# Patient Record
Sex: FEMALE | Race: WHITE | NOT HISPANIC OR LATINO | Employment: UNEMPLOYED | URBAN - METROPOLITAN AREA
[De-identification: names, ages, dates, MRNs, and addresses within clinical notes are randomized per-mention and may not be internally consistent; named-entity substitution may affect disease eponyms.]

---

## 2019-10-02 ENCOUNTER — OFFICE VISIT (OUTPATIENT)
Dept: FAMILY MEDICINE CLINIC | Facility: OTHER | Age: 54
End: 2019-10-02
Payer: COMMERCIAL

## 2019-10-02 VITALS
OXYGEN SATURATION: 98 % | DIASTOLIC BLOOD PRESSURE: 84 MMHG | WEIGHT: 126.13 LBS | HEIGHT: 62 IN | TEMPERATURE: 98.8 F | SYSTOLIC BLOOD PRESSURE: 116 MMHG | HEART RATE: 76 BPM | BODY MASS INDEX: 23.21 KG/M2

## 2019-10-02 DIAGNOSIS — Z23 ENCOUNTER FOR IMMUNIZATION: Primary | ICD-10-CM

## 2019-10-02 DIAGNOSIS — R22.1 LUMP IN NECK: ICD-10-CM

## 2019-10-02 DIAGNOSIS — Z12.39 BREAST CANCER SCREENING: ICD-10-CM

## 2019-10-02 DIAGNOSIS — Z13.6 SCREENING FOR CARDIOVASCULAR CONDITION: ICD-10-CM

## 2019-10-02 DIAGNOSIS — Z13.1 SCREENING FOR DIABETES MELLITUS: ICD-10-CM

## 2019-10-02 PROCEDURE — 99214 OFFICE O/P EST MOD 30 MIN: CPT | Performed by: FAMILY MEDICINE

## 2019-10-02 PROCEDURE — 90715 TDAP VACCINE 7 YRS/> IM: CPT | Performed by: FAMILY MEDICINE

## 2019-10-02 PROCEDURE — 90682 RIV4 VACC RECOMBINANT DNA IM: CPT | Performed by: FAMILY MEDICINE

## 2019-10-02 PROCEDURE — 90472 IMMUNIZATION ADMIN EACH ADD: CPT | Performed by: FAMILY MEDICINE

## 2019-10-02 PROCEDURE — 90471 IMMUNIZATION ADMIN: CPT | Performed by: FAMILY MEDICINE

## 2019-10-02 NOTE — PROGRESS NOTES
Subjective:      Patient ID: Yesenia Nieto is a 48 y o  female  Patient is a 48year old female with no significant PMHx who presents after noticing 3 lumps on her neck this past Sunday  Patient says that the lumps are not painful or red  Patient denies changes in appetite or weight  Denies fatigue or fever  Bowel movements and urination are normal   Patient also complaining of a rash  Rash   This is a chronic problem  Episode onset: 3 months  The problem is unchanged  The affected locations include the abdomen (epigastric region )  The rash is characterized by redness and itchiness  She was exposed to nothing  Pertinent negatives include no anorexia, congestion, cough, diarrhea, eye pain, facial edema, fatigue, fever, joint pain, nail changes, rhinorrhea, shortness of breath, sore throat or vomiting  Past treatments include moisturizer  Improvement on treatment: rash did not improve, but pruritis did improve  There is no history of allergies, asthma or eczema  The following portions of the patient's history were reviewed and updated as appropriate: allergies, current medications, past family history, past medical history, past social history, past surgical history and problem list      Review of Systems   Constitutional: Negative for activity change, appetite change, chills, fatigue, fever and unexpected weight change  HENT: Negative for congestion, rhinorrhea and sore throat  Eyes: Negative for pain  Respiratory: Negative for cough, chest tightness and shortness of breath  Cardiovascular: Negative for chest pain  Gastrointestinal: Negative for abdominal pain, anorexia, constipation, diarrhea, nausea and vomiting  Genitourinary: Negative for difficulty urinating and dysuria  Musculoskeletal: Negative for arthralgias, gait problem, joint pain, joint swelling and myalgias  Skin: Positive for rash  Negative for nail changes     Neurological: Negative for dizziness and light-headedness  Objective:      /84 (BP Location: Left arm, Patient Position: Sitting, Cuff Size: Adult)   Pulse 76   Temp 98 8 °F (37 1 °C) (Tympanic)   Ht 5' 2 25" (1 581 m)   Wt 57 2 kg (126 lb 2 oz)   SpO2 98%   BMI 22 88 kg/m²          Physical Exam   Constitutional: She is oriented to person, place, and time  She appears well-developed and well-nourished  No distress  HENT:   Head: Normocephalic and atraumatic  Right Ear: External ear normal    Left Ear: External ear normal    Nose: Nose normal    Mouth/Throat: Oropharynx is clear and moist  No oropharyngeal exudate  Eyes: Conjunctivae and EOM are normal  Right eye exhibits no discharge  Left eye exhibits no discharge  No scleral icterus  Neck: Normal range of motion and full passive range of motion without pain  Neck supple  No JVD present  No spinous process tenderness and no muscular tenderness present  No neck rigidity  Edema present  No tracheal deviation, no erythema and normal range of motion present  No thyroid mass and no thyromegaly present  Three [id number] lumps- soft, mobile and nontender, non erythematous      Cardiovascular: Normal rate, regular rhythm, normal heart sounds and intact distal pulses  Exam reveals no gallop and no friction rub  No murmur heard  Pulmonary/Chest: Effort normal and breath sounds normal  No stridor  No respiratory distress  She has no wheezes  She has no rales  Abdominal: Soft  Bowel sounds are normal  She exhibits no distension and no mass  There is no tenderness  There is no rebound and no guarding  Musculoskeletal: Normal range of motion  She exhibits no edema, tenderness or deformity  Lymphadenopathy:     She has no cervical adenopathy  Neurological: She is alert and oriented to person, place, and time  She displays normal reflexes  No cranial nerve deficit or sensory deficit  She exhibits normal muscle tone  Coordination normal    Skin: Skin is warm   Capillary refill takes less than 2 seconds  Rash (  in epigastric region of abdomen, tiny red papules ) noted  She is not diaphoretic  There is erythema  Psychiatric: She has a normal mood and affect  Her behavior is normal  Judgment and thought content normal        Assessment/Plan:    No problem-specific Assessment & Plan notes found for this encounter  Diagnoses and all orders for this visit:    Encounter for immunization  -     influenza vaccine, [id number], quadrivalent, recombinant, PF, 0 5 mL, for patients 18 yr+ (FLUBLOK)  -     TDAP VACCINE GREATER THAN OR EQUAL TO 6YO IM    Breast cancer screening  -     Mammo screening bilateral w 3d & cad; Future    Screening for cardiovascular condition  -     Lipid panel; Future    Screening for diabetes mellitus  -     Comprehensive metabolic panel; Future    Lump in neck  -     TSH, 3rd generation with Free T4 reflex; Future  -     US head neck soft tissue; Future      Patient is a well-appearing 48year old woman who complains of lumps in her neck for 4 days as well as an epigastric rash of 3 months  Patient was advised to apply hydrocortisone cream 2 times a day to the rash  Patient was advised to wait 1 week to see if lumps resolve and if not to go for neck ultrasound  Will screen patient for diabetes with CMP and cardiovascular disease with fasting lipid panel (due to family history of diabetes, high cholesterol and her age)  Will follow up with patient in 1 month for annual physical and to recheck lumps  Return in about 4 weeks (around 10/30/2019) for Annual physical, Recheck lumps  The patient indicates understanding of these issues and agrees with the plan        Dm Sarmiento MD

## 2019-10-09 ENCOUNTER — HOSPITAL ENCOUNTER (OUTPATIENT)
Dept: RADIOLOGY | Facility: HOSPITAL | Age: 54
Discharge: HOME | End: 2019-10-09
Payer: COMMERCIAL

## 2019-10-09 DIAGNOSIS — R22.1 LUMP IN NECK: ICD-10-CM

## 2019-10-09 PROCEDURE — 76536 US EXAM OF HEAD AND NECK: CPT

## 2019-10-11 LAB
ALBUMIN SERPL-MCNC: 4.3 G/DL (ref 3.6–5.1)
ALBUMIN/GLOB SERPL: 1.7 (CALC) (ref 1–2.5)
ALP SERPL-CCNC: 46 U/L (ref 33–130)
ALT SERPL-CCNC: 10 U/L (ref 6–29)
AST SERPL-CCNC: 17 U/L (ref 10–35)
BILIRUB SERPL-MCNC: 0.8 MG/DL (ref 0.2–1.2)
BUN SERPL-MCNC: 11 MG/DL (ref 7–25)
BUN/CREAT SERPL: NORMAL (CALC) (ref 6–22)
CALCIUM SERPL-MCNC: 9.3 MG/DL (ref 8.6–10.4)
CHLORIDE SERPL-SCNC: 104 MMOL/L (ref 98–110)
CHOLEST SERPL-MCNC: 274 MG/DL
CHOLEST/HDLC SERPL: 2.7 (CALC)
CO2 SERPL-SCNC: 25 MMOL/L (ref 20–32)
CREAT SERPL-MCNC: 0.87 MG/DL (ref 0.5–1.05)
GLOBULIN SER CALC-MCNC: 2.6 G/DL (CALC) (ref 1.9–3.7)
GLUCOSE SERPL-MCNC: 80 MG/DL (ref 65–99)
HDLC SERPL-MCNC: 103 MG/DL
LDLC SERPL CALC-MCNC: 155 MG/DL (CALC)
NONHDLC SERPL-MCNC: 171 MG/DL (CALC)
POTASSIUM SERPL-SCNC: 4 MMOL/L (ref 3.5–5.3)
PROT SERPL-MCNC: 6.9 G/DL (ref 6.1–8.1)
SL AMB EGFR AFRICAN AMERICAN: 89 ML/MIN/1.73M2
SL AMB EGFR NON AFRICAN AMERICAN: 77 ML/MIN/1.73M2
SODIUM SERPL-SCNC: 137 MMOL/L (ref 135–146)
TRIGL SERPL-MCNC: 64 MG/DL
TSH SERPL-ACNC: 2.22 MIU/L

## 2019-10-12 ENCOUNTER — HOSPITAL ENCOUNTER (OUTPATIENT)
Dept: MAMMOGRAPHY | Facility: HOSPITAL | Age: 54
Discharge: HOME | End: 2019-10-12
Payer: COMMERCIAL

## 2019-10-12 VITALS — BODY MASS INDEX: 22.7 KG/M2 | HEIGHT: 63 IN

## 2019-10-12 DIAGNOSIS — Z12.39 BREAST CANCER SCREENING: ICD-10-CM

## 2019-10-12 PROCEDURE — 77063 BREAST TOMOSYNTHESIS BI: CPT

## 2019-10-12 PROCEDURE — 77067 SCR MAMMO BI INCL CAD: CPT

## 2019-10-15 ENCOUNTER — TELEPHONE (OUTPATIENT)
Dept: FAMILY MEDICINE CLINIC | Facility: OTHER | Age: 54
End: 2019-10-15

## 2019-10-15 NOTE — TELEPHONE ENCOUNTER
Left message for patient    ----- Message from Sylwia Santos MD sent at 10/14/2019  4:47 PM EDT -----  Please inform patient that her 'bad cholesterol' is high and she should begin a low fat diet  PCP will discuss more about this at next visit

## 2019-10-15 NOTE — TELEPHONE ENCOUNTER
Left message    ----- Message from Marian Armando MD sent at 10/14/2019  4:43 PM EDT -----  Please inform patient that her ultrasound didn't show any suspicious findings- lumps are enlarged lymph nodes due to inflammation  Patient should monitor lumps for 1 month

## 2019-10-31 ENCOUNTER — OFFICE VISIT (OUTPATIENT)
Dept: FAMILY MEDICINE CLINIC | Facility: OTHER | Age: 54
End: 2019-10-31
Payer: COMMERCIAL

## 2019-10-31 VITALS
BODY MASS INDEX: 22.17 KG/M2 | OXYGEN SATURATION: 98 % | WEIGHT: 125.13 LBS | HEIGHT: 63 IN | DIASTOLIC BLOOD PRESSURE: 70 MMHG | HEART RATE: 62 BPM | TEMPERATURE: 99 F | SYSTOLIC BLOOD PRESSURE: 112 MMHG

## 2019-10-31 DIAGNOSIS — R59.0 LAD (LYMPHADENOPATHY), CERVICAL: ICD-10-CM

## 2019-10-31 DIAGNOSIS — Z00.00 ROUTINE PHYSICAL EXAMINATION: Primary | ICD-10-CM

## 2019-10-31 DIAGNOSIS — E78.2 MIXED HYPERLIPIDEMIA: ICD-10-CM

## 2019-10-31 PROCEDURE — 99396 PREV VISIT EST AGE 40-64: CPT | Performed by: FAMILY MEDICINE

## 2019-10-31 NOTE — PROGRESS NOTES
History & Physical  Rowdy Anne 48 y o  female MRN: [id number]      History of Present Illness        HPI: Rowdy Anne is a 48y o  year old female who presents for her annual exam  The patient has no complaints today  She would like to discuss her labs and US results as well  The patient is sexually active  The patient wears seatbelts: yes  last pap: was normal  1/2019  The patient has regular exercise: yes  The patient has ever been transfused or tattooed?: no     The patient reports that domestic violence in her life is absent  History of abnormal pap smear? No  Vaccination is UTD      Depression Screening: PHQ-2  1  Over the past two weeks, have you felt down, depressed or hopeless? No  2  Over the past two weeks, have you felt little interest or pleasure in doing things? No        Review of Systems      Constitutional:  Denies fever or chills   Eyes:  Denies change in visual acuity   HENT:  Denies nasal congestion or sore throat   Respiratory:  Denies cough or shortness of breath   Cardiovascular:  Denies chest pain or edema   GI:  Denies abdominal pain, nausea, vomiting, bloody stools or diarrhea   :  Denies dysuria   Musculoskeletal:  Denies back pain or joint pain   Integument:  Denies rash   Neurologic:  Denies headache, focal weakness or sensory changes   Endocrine:  Denies polyuria or polydipsia   Lymphatic:  Denies swollen glands   Psychiatric:  Denies depression or anxiety     Historical Information   History reviewed  No pertinent past medical history    Past Surgical History:   Procedure Laterality Date    AUGMENTATION MAMMAPLASTY Bilateral 02/11/2016    BREAST SURGERY       Social History     Substance and Sexual Activity   Alcohol Use Yes    Frequency: 2-3 times a week     Social History     Substance and Sexual Activity   Drug Use Never     Social History     Tobacco Use   Smoking Status Never Smoker   Smokeless Tobacco Never Used       Family History: Family History   Problem Relation Age of Onset    No Known Problems Mother     Hypertension Father     Diabetes Father     Breast cancer Sister     No Known Problems Daughter     No Known Problems Sister     No Known Problems Paternal Aunt     No Known Problems Paternal Aunt     No Known Problems Paternal Aunt     No Known Problems Paternal Aunt        Medications, Allergies: All current active meds have been reviewed, current meds:  No Known Allergies    No current outpatient medications on file  Subjective / Objective:   Vitals:  Vitals:    10/31/19 1012   BP: 112/70   Pulse: 62   Temp: 99 °F (37 2 °C)   SpO2: 98%     /70 (BP Location: Left arm, Patient Position: Sitting, Cuff Size: Adult)   Pulse 62   Temp 99 °F (37 2 °C) (Tympanic)   Ht 5' 2 5" (1 588 m)   Wt 56 8 kg (125 lb 2 oz)   LMP 10/08/2019 (Exact Date) Comment: pt  denies pregnacy  SpO2 98%   BMI 22 52 kg/m²       Physical Exam:   Physical Exam    Constitutional:  Well developed, well nourished, no acute distress, non-toxic appearance   Eyes:  PERRL, conjunctiva normal   HENT:  Atraumatic, external ears normal, nose normal, oropharynx moist, no pharyngeal exudates  Neck- normal range of motion, no tenderness, supple   Respiratory:  No respiratory distress, normal breath sounds, no rales, no wheezing   Cardiovascular:  Normal rate, normal rhythm, no murmurs, no gallops, no rubs   GI:  Soft, nondistended, normal bowel sounds, nontender, no organomegaly, no mass, no rebound, no guarding   :  No costovertebral angle tenderness   Musculoskeletal:  No edema, no tenderness, no deformities   Back- no tenderness  Integument:  Well hydrated, no rash   Lymphatic:  No lymphadenopathy noted   Neurologic:  Alert & oriented x 3, CN 2-12 normal, normal motor function, normal sensory function, no focal deficits noted   Psychiatric:  Speech and behavior appropriate       Labs:   No results found for this or any previous visit (from the past 168 hour(s))  Imaging & Testing     Imaging: Us Head Neck Soft Tissue    Result Date: 10/14/2019  Narrative: LEFT NECK ULTRASOUND INDICATION:   R22 1: Localized swelling, mass and lump, neck  Left posterior neck palpable areas x 1 5 weeks  COMPARISON:  None TECHNIQUE:   Real-time ultrasound of the left upper posterior neck was performed with a linear transducer with both volumetric sweeps and still imaging techniques  Survey images of the contralateral right neck were also provided for comparison  FINDINGS:  In the superficial subcutaneous tissues of the upper posterior left neck are small hypoechoic nodules, the largest measuring 1 1 x 0 3 x 0 7 cm suggestive of lymph nodes  No skin thickening or focal fluid collections  Impression: Small lymph nodes appear to correspond to palpable abnormality  No suspicious findings  Follow-up if any may be based on clinical grounds  Workstation performed: [id number]     Mammo Screening Bilateral W 3d & Cad    Result Date: 10/23/2019  Narrative: DIAGNOSIS: Breast cancer screening TECHNIQUE: Digital screening mammography was performed  Computer Aided Detection (CAD) analyzed all applicable images  COMPARISONS:  Multiple prior exams dating between 06/09/2010 and 08/07/2017  RELEVANT HISTORY: Family Breast Cancer History: History of breast cancer in Sister  Family Medical History: Family medical history includes breast cancer in sister  Personal History: No known relevant hormone history  Surgical history includes breast enhancement  No known relevant medical history  The patient is scheduled in a reminder system for screening mammography  8-10% of cancers will be missed on mammography  Management of a palpable abnormality must be based on clinical grounds  Patients will be notified of their results via letter from our facility  Accredited by [employer] of [employer] and FDA   RISK ASSESSMENT: 5 Year Tyrer-Cuzick: 2 66 % 10 Year Tyrer-Cuzick: 5 66 % Lifetime Keaton: 20 97 % TISSUE DENSITY: The breasts are extremely dense, which lowers the sensitivity of mammography  INDICATION: Danielle Rice is a 48 y o  female presenting for screening mammography  FINDINGS: Bilateral There are no suspicious masses, grouped microcalcifications or areas of architectural distortion  The skin and nipple areolar complex are unremarkable  Bilateral retropectoral silicone implants are noted  Benign-appearing calcifications are noted in the left breast      Impression: No mammographic evidence of malignancy  This patient has been identified as being at elevated risk for development of breast cancer based on the Renettaer-Sundeep model  She may benefit from supplemental screening  ASSESSMENT/BI-RADS CATEGORY: Left: 2 - Benign Right: 2 - Benign Overall: 2 - Benign RECOMMENDATION:      - Routine screening mammogram in 1 year for both breasts  Workstation ID: [id number]         Assessment/Plan     Assessment:   Diagnoses and all orders for this visit:    Routine physical examination    LAD (lymphadenopathy), cervical  She still has some LAD in the posterior cervical region of left side mainly that I could appreciate  Will have her rechecked in 3 months  The US shows normal lymph nodes  -     CBC and differential; Future  -     Comprehensive metabolic panel; Future  -     TSH, 3rd generation with Free T4 reflex; Future  -     Lipid panel; Future  -     US head neck soft tissue; Future    Mixed hyperlipidemia  Will have a trial of low fat diet and regular exercise  Recheck labs and determine plan of care  Next visit  FU in 3 months   -     CBC and differential; Future  -     Comprehensive metabolic panel; Future  -     TSH, 3rd generation with Free T4 reflex; Future  -     Lipid panel; Future          Plan:   Hand out given and reinforced healthy diet, lifestyle, exercise and safety    Pap smear   Calcium and vit D supplement recommended  Immunizations: Tetanus and flu updated  Recommended dental and vision exams routinely    Over 40 years:  Recommended Mammogram screening  Over 50 years:  Recommended colonoscopy screening         To report postmenopausal bleeding        Recommended labs         Hormone replacement discussed       Bone density screening            ASA prophylaxis       Immunizations: Pneumococcal more than or equal to 65 years and      shingles at 60 years

## 2019-10-31 NOTE — PATIENT INSTRUCTIONS

## 2020-01-17 ENCOUNTER — OFFICE VISIT (OUTPATIENT)
Dept: OBGYN CLINIC | Facility: CLINIC | Age: 55
End: 2020-01-17
Payer: COMMERCIAL

## 2020-01-17 VITALS
SYSTOLIC BLOOD PRESSURE: 138 MMHG | WEIGHT: 125 LBS | BODY MASS INDEX: 22.15 KG/M2 | HEIGHT: 63 IN | DIASTOLIC BLOOD PRESSURE: 84 MMHG

## 2020-01-17 DIAGNOSIS — Z12.11 SCREENING FOR COLON CANCER: ICD-10-CM

## 2020-01-17 DIAGNOSIS — Z01.419 WOMEN'S ANNUAL ROUTINE GYNECOLOGICAL EXAMINATION: Primary | ICD-10-CM

## 2020-01-17 DIAGNOSIS — Z12.31 ENCOUNTER FOR SCREENING MAMMOGRAM FOR MALIGNANT NEOPLASM OF BREAST: ICD-10-CM

## 2020-01-17 PROCEDURE — 99386 PREV VISIT NEW AGE 40-64: CPT | Performed by: OBSTETRICS & GYNECOLOGY

## 2020-01-17 PROCEDURE — 3008F BODY MASS INDEX DOCD: CPT | Performed by: OBSTETRICS & GYNECOLOGY

## 2020-01-17 NOTE — PROGRESS NOTES
ASSESSMENT & PLAN:   Diagnoses and all orders for this visit:    Women's annual routine gynecological examination    Encounter for screening mammogram for malignant neoplasm of breast  -     Mammo screening bilateral w 3d & cad; Future    Screening for colon cancer  -     Ambulatory referral to Gastroenterology; Future          The following were reviewed in today's visit: Current ASCCP Guidelines, breast self exam, mammography screening ordered, family planning choices, menopause, exercise, healthy diet and colonoscopy discussed and ordered  Patient to return to office yearly for annual exam      All questions have been answered to her satisfaction  CC:  Annual Gynecologic Examination    HPI: Susana Soria is a 48 y o  Coreen Husk who presents for annual gynecologic examination  She has the following concerns:  None  Health Maintenance:    She exercises 5 days per week  She wears her seatbelt routinely  She does perform regular monthly self breast exams  She feels safe at home  Patient tries to follow a balanced diet  Last mammogram: 10/2019  Last colonoscopy: at age 50 in [place], sister with polyps  History reviewed  No pertinent past medical history  Past Surgical History:   Procedure Laterality Date    AUGMENTATION MAMMAPLASTY Bilateral 2016    BREAST SURGERY         Past OB/Gyn History:   Patient's last menstrual period was 2019 (exact date)  Menstrual History:  OB History        1    Para   1    Term   1            AB        Living   1       SAB        TAB        Ectopic        Multiple        Live Births   1                  Patient's last menstrual period was 2019 (exact date)  Menstrual history: Patient is post menopausal    History of sexually transmitted infection: No  Patient is currently sexually active  Birth control: postmenopausal  Last Pap 2019 :  no abnormalities      Family History   Problem Relation Age of Onset  No Known Problems Mother     Hypertension Father     Diabetes Father     Breast cancer Sister 55    No Known Problems Daughter     No Known Problems Sister     No Known Problems Paternal Aunt     No Known Problems Paternal Aunt     No Known Problems Paternal Aunt     No Known Problems Paternal Aunt        Social History:  Social History     Socioeconomic History    Marital status: /Civil Union     Spouse name: Not on file    Number of children: Not on file    Years of education: Not on file    Highest education level: Not on file   Occupational History    Not on file   Social Needs    Financial resource strain: Not on file    Food insecurity:     Worry: Not on file     Inability: Not on file    Transportation needs:     Medical: Not on file     Non-medical: Not on file   Tobacco Use    Smoking status: Never Smoker    Smokeless tobacco: Never Used   Substance and Sexual Activity    Alcohol use: Yes     Frequency: 2-3 times a week    Drug use: Never    Sexual activity: Yes     Partners: Male     Birth control/protection: None   Lifestyle    Physical activity:     Days per week: Not on file     Minutes per session: Not on file    Stress: Not on file   Relationships    Social connections:     Talks on phone: Not on file     Gets together: Not on file     Attends Restoration service: Not on file     Active member of club or organization: Not on file     Attends meetings of clubs or organizations: Not on file     Relationship status: Not on file    Intimate partner violence:     Fear of current or ex partner: Not on file     Emotionally abused: Not on file     Physically abused: Not on file     Forced sexual activity: Not on file   Other Topics Concern    Not on file   Social History Narrative    Not on file     Presently lives with   Patient is   No Known Allergies    No current outpatient medications on file      Review of Systems:  Review of Systems Constitutional: Negative for unexpected weight change  Respiratory: Negative for shortness of breath  Cardiovascular: Negative for chest pain  Gastrointestinal: Negative for abdominal distention, abdominal pain, blood in stool, constipation, nausea and vomiting  Genitourinary: Negative for difficulty urinating, dysuria, frequency, menstrual problem, vaginal bleeding, vaginal discharge and vaginal pain  Neurological: Negative for headaches  Physical Exam:  /84   Ht 5' 2 5" (1 588 m)   Wt 56 7 kg (125 lb)   LMP 12/14/2019 (Exact Date)   Breastfeeding? No   BMI 22 50 kg/m²    Physical Exam   Constitutional: She is oriented to person, place, and time  Vital signs are normal  She appears well-developed and well-nourished  Genitourinary: Vagina normal and uterus normal  Pelvic exam was performed with patient supine  There is no rash, tenderness or lesion on the right labia  There is no rash, tenderness or lesion on the left labia  Vagina exhibits rugosity  No erythema, tenderness or bleeding in the vagina  No vaginal discharge found  Right adnexum does not display mass, does not display tenderness and does not display fullness  Left adnexum does not display mass, does not display tenderness and does not display fullness  Cervix is parous  Cervix exhibits pinkness  Cervix does not exhibit motion tenderness, lesion or polyp  Uterus is mobile  Uterus is not enlarged, tender or irregular (is regular)  HENT:   Head: Normocephalic  Neck: No thyromegaly present  Cardiovascular: Normal rate, regular rhythm and normal heart sounds  Pulmonary/Chest: Effort normal and breath sounds normal  No respiratory distress  Right breast exhibits no inverted nipple, no mass, no nipple discharge, no skin change and no tenderness  Left breast exhibits no inverted nipple, no mass, no nipple discharge, no skin change and no tenderness  B/l implants   Abdominal: Soft  She exhibits no distension  There is no tenderness  Neurological: She is alert and oriented to person, place, and time  Psychiatric: She has a normal mood and affect  Her behavior is normal    Vitals reviewed